# Patient Record
Sex: MALE | Race: WHITE | NOT HISPANIC OR LATINO | Employment: PART TIME | URBAN - METROPOLITAN AREA
[De-identification: names, ages, dates, MRNs, and addresses within clinical notes are randomized per-mention and may not be internally consistent; named-entity substitution may affect disease eponyms.]

---

## 2020-03-12 ENCOUNTER — OFFICE VISIT (OUTPATIENT)
Dept: FAMILY MEDICINE CLINIC | Facility: CLINIC | Age: 20
End: 2020-03-12

## 2020-03-12 VITALS
WEIGHT: 147.8 LBS | DIASTOLIC BLOOD PRESSURE: 84 MMHG | HEIGHT: 69 IN | HEART RATE: 87 BPM | BODY MASS INDEX: 21.89 KG/M2 | TEMPERATURE: 97.4 F | SYSTOLIC BLOOD PRESSURE: 148 MMHG | OXYGEN SATURATION: 98 %

## 2020-03-12 DIAGNOSIS — R39.15 URINARY URGENCY: ICD-10-CM

## 2020-03-12 DIAGNOSIS — Z11.4 SCREENING FOR HIV (HUMAN IMMUNODEFICIENCY VIRUS): Primary | ICD-10-CM

## 2020-03-12 DIAGNOSIS — Z20.2 POSSIBLE EXPOSURE TO STD: ICD-10-CM

## 2020-03-12 LAB
SL AMB  POCT GLUCOSE, UA: NORMAL
SL AMB LEUKOCYTE ESTERASE,UA: NORMAL
SL AMB POCT BILIRUBIN,UA: NORMAL
SL AMB POCT BLOOD,UA: NORMAL
SL AMB POCT CLARITY,UA: CLEAR
SL AMB POCT COLOR,UA: YELLOW
SL AMB POCT KETONES,UA: 5
SL AMB POCT NITRITE,UA: NORMAL
SL AMB POCT PH,UA: 6
SL AMB POCT SPECIFIC GRAVITY,UA: 1.01
SL AMB POCT URINE PROTEIN: 15
SL AMB POCT UROBILINOGEN: 0.2

## 2020-03-12 PROCEDURE — 3008F BODY MASS INDEX DOCD: CPT | Performed by: FAMILY MEDICINE

## 2020-03-12 PROCEDURE — 81002 URINALYSIS NONAUTO W/O SCOPE: CPT | Performed by: FAMILY MEDICINE

## 2020-03-12 PROCEDURE — 99203 OFFICE O/P NEW LOW 30 MIN: CPT | Performed by: FAMILY MEDICINE

## 2020-03-12 NOTE — PROGRESS NOTES
Assessment/Plan:       Problem List Items Addressed This Visit        Other    Urinary urgency     Urinary urgency and frequency over the last several months patient notes that he has difficulty holding the urine for short periods of time and then has significant urge and if he does not urinate he can develop incontinence  Urinalysis done here at the office is negative it is clear he denies any discharge or pain with urination no fever other abdominal pain or discomfort  At this point we discussed urology evaluation to further look into this which she is agreeable to         Relevant Orders    POCT urine dip (Completed)    Ambulatory referral to Urology    CBC and differential    Comprehensive metabolic panel    Herpes Simplex Virus (HSV) Types 1 and 2, DAVID    HEPATITIS B VIRUS (HBV) EVALUATION PROFILE    Chlamydia/GC amplified DNA by PCR      Other Visit Diagnoses     Screening for HIV (human immunodeficiency virus)    -  Primary    Relevant Orders    HIV 1/2 Antigen/Antibody (4th Generation) w Reflex [facility]    Possible exposure to STD        Relevant Orders    Herpes Simplex Virus (HSV) Types 1 and 2, DAVID    HEPATITIS B VIRUS (HBV) EVALUATION PROFILE    Chlamydia/GC amplified DNA by PCR            Subjective:      Patient ID: Edgar eB is a 22 y o  male  This patient presents for evaluation general checkup reestablish care with medical provider and discuss urinary frequency and urgency with occasional incontinence no sign of infection or burning or fever associated with this it is been present for the last several months      The following portions of the patient's history were reviewed and updated as appropriate: allergies, current medications, past family history, past medical history, past social history, past surgical history and problem list     Review of Systems   Constitutional: Negative for chills, fatigue and fever     HENT: Negative for congestion, nosebleeds, rhinorrhea, sinus pressure and sore throat  Eyes: Negative for discharge and redness  Respiratory: Negative for cough and shortness of breath  Cardiovascular: Negative for chest pain, palpitations and leg swelling  Gastrointestinal: Negative for abdominal pain, blood in stool and nausea  Endocrine: Negative for cold intolerance, heat intolerance and polyuria  Genitourinary: Positive for decreased urine volume, frequency and urgency  Negative for dysuria  Musculoskeletal: Negative for arthralgias, back pain and myalgias  Skin: Negative for rash  Neurological: Negative for dizziness, weakness and headaches  Hematological: Negative for adenopathy  Psychiatric/Behavioral: Negative for behavioral problems and sleep disturbance  The patient is not nervous/anxious  Objective:      /84   Pulse 87   Temp (!) 97 4 °F (36 3 °C)   Ht 5' 9" (1 753 m)   Wt 67 kg (147 lb 12 8 oz)   SpO2 98%   BMI 21 83 kg/m²        Physical Exam   Constitutional: He is oriented to person, place, and time  He appears well-developed and well-nourished  HENT:   Head: Normocephalic and atraumatic  Right Ear: External ear normal    Left Ear: External ear normal    Nose: Nose normal    Mouth/Throat: Oropharynx is clear and moist    Eyes: Pupils are equal, round, and reactive to light  Conjunctivae and EOM are normal  No scleral icterus  Neck: Normal range of motion  Neck supple  No JVD present  No thyromegaly present  Cardiovascular: Normal rate, regular rhythm and normal heart sounds  No murmur heard  Pulmonary/Chest: Effort normal and breath sounds normal  He has no wheezes  He has no rales  He exhibits no tenderness  Abdominal: Soft  Bowel sounds are normal  He exhibits no distension and no mass  There is no tenderness  There is no rebound and no guarding  Musculoskeletal: Normal range of motion  He exhibits no edema, tenderness or deformity  Lymphadenopathy:     He has no cervical adenopathy     Neurological: He is alert and oriented to person, place, and time  He has normal reflexes  He displays normal reflexes  No cranial nerve deficit  Skin: Skin is warm and dry  No rash noted  No erythema  Psychiatric: He has a normal mood and affect  His behavior is normal  Judgment and thought content normal    Nursing note and vitals reviewed         Data:    Laboratory Results:   Radiology/Other Diagnostic Testing Results:      No results found for: WBC, HGB, HCT, MCV, PLT  No results found for: NA, K, CL, CO2, ANIONGAP, BUN, CREATININE, GLUCOSE, GLUF, CALCIUM, CORRECTEDCA, AST, ALT, ALKPHOS, PROT, BILITOT, EGFR  No results found for: CHOLESTEROL  No results found for: HDL  No results found for: LDLCALC  No results found for: TRIG  No results found for: CHOLHDL  No results found for: [id number], TSH  No results found for: HGBA1C  No results found for: PSA    [facility], DO

## 2020-03-12 NOTE — ASSESSMENT & PLAN NOTE
Urinary urgency and frequency over the last several months patient notes that he has difficulty holding the urine for short periods of time and then has significant urge and if he does not urinate he can develop incontinence  Urinalysis done here at the office is negative it is clear he denies any discharge or pain with urination no fever other abdominal pain or discomfort    At this point we discussed urology evaluation to further look into this which she is agreeable to

## 2020-03-12 NOTE — PATIENT INSTRUCTIONS
Urinary Urgency and Frequency   AMBULATORY CARE:   Urinary urgency and frequency  is a condition that increases how strongly or how often you need to urinate  The condition may also be called urgency-frequency syndrome  Urinary urgency means you feel such a strong need to urinate that you have trouble waiting  You may also feel discomfort in your bladder  Urinary frequency means you need to urinate many times during the day  This may also be called increased daytime frequency  You may be woken from sleep by the need to urinate  Urgency and frequency often happen together, but you may only have one  Contact your healthcare provider if:   · Your urine is pink, or you notice blood in your urine  · You have pain with urination  · You continue to have symptoms even after you take your medicine  · You have new or worsening symptoms  · You have questions or concerns about your condition or care  Treatment  will depend on the type and cause of your urination problems  You may need any of the following:  · Medicines  may be given to relax your bladder and decrease urination  You may also need antibiotics if your symptoms are caused by a bacterial infection  · Sacral nerve stimulation  sends electrical signals to your sacral nerve through a small device implanted under your skin  Your sacral nerve controls your bladder, sphincter, and pelvic floor muscles  · Botox injections  into your bladder may help relax your bladder muscle to decrease urgency and frequency  · Surgery  may be done if all other treatments cannot help you control your bladder  Manage urinary urgency and frequency:   · Keep a record of your urination patterns for a few days  Write down the number of times you urinate over 24 hours, the amount, and if you have urine leakage  Record how strong the urge to urinate was each time  Your healthcare provider may also want you to record the type and amount of liquids you drink  · Train your bladder  Go to the bathroom at set times, such as every 2 hours, even if you do not feel the urge to go  You can also try to hold your urine when you feel the urge to go  For example, hold your urine for 5 minutes when you feel the urge to go  As that becomes easier, hold your urine for 10 minutes  Work up to every 3 or 4 hours to help control your bladder  · Limit liquids as directed  Limit liquids to decrease the amount you urinate  Ask how much liquid to drink each day and which liquids are best for you  You may need to avoid drinking liquids several hours before you go to sleep  Your healthcare provider may also recommend that you limit caffeine and alcohol  · Do Kegel exercises often  Kegel exercises help strengthen your pelvic muscles and improve bladder control  These muscles help you stop urinating  Squeeze these muscles tightly for 5 seconds like you are trying to stop the flow of urine  Then relax for 5 seconds  Gradually work up to squeezing for 10 seconds  Do 3 sets of 15 repetitions a day, or as directed  · Exercise regularly and maintain a healthy weight  Ask your healthcare provider how much you should weigh and about the best exercise plan for you  Extra weight puts pressure on your bladder and may make your symptoms worse  Ask your provider to help you create a safe weight loss plan if you are overweight  Follow up with your healthcare provider as directed: Your healthcare provider may refer you to a specialist to find the cause of your symptoms  Write down your questions so you remember to ask them during your visits  © 2017 [facility] [place] [employer] Information is for End User's use only and may not be sold, redistributed or otherwise used for commercial purposes  All illustrations and images included in CareNotes® are the copyrighted property of [employer] , [employer]  or [employer]  The above information is an  only   It is not intended as medical advice for individual conditions or treatments  Talk to your doctor, nurse or pharmacist before following any medical regimen to see if it is safe and effective for you

## 2020-09-03 ENCOUNTER — OFFICE VISIT (OUTPATIENT)
Dept: FAMILY MEDICINE CLINIC | Facility: CLINIC | Age: 20
End: 2020-09-03
Payer: COMMERCIAL

## 2020-09-03 VITALS
DIASTOLIC BLOOD PRESSURE: 80 MMHG | SYSTOLIC BLOOD PRESSURE: 122 MMHG | HEIGHT: 70 IN | HEART RATE: 82 BPM | OXYGEN SATURATION: 98 % | TEMPERATURE: 97.2 F | WEIGHT: 147 LBS | BODY MASS INDEX: 21.05 KG/M2

## 2020-09-03 DIAGNOSIS — F32.A MILD DEPRESSION: ICD-10-CM

## 2020-09-03 DIAGNOSIS — F41.9 ANXIETY: ICD-10-CM

## 2020-09-03 DIAGNOSIS — Z00.00 ANNUAL PHYSICAL EXAM: Primary | ICD-10-CM

## 2020-09-03 PROBLEM — F12.90 MARIJUANA USE: Status: ACTIVE | Noted: 2020-09-03

## 2020-09-03 PROBLEM — F10.10 ALCOHOL ABUSE: Status: ACTIVE | Noted: 2020-09-03

## 2020-09-03 PROCEDURE — 99395 PREV VISIT EST AGE 18-39: CPT | Performed by: NURSE PRACTITIONER

## 2020-09-03 RX ORDER — CITALOPRAM 10 MG/1
10 TABLET ORAL DAILY
Qty: 90 TABLET | Refills: 0 | Status: SHIPPED | OUTPATIENT
Start: 2020-09-03 | End: 2020-10-15 | Stop reason: SDUPTHER

## 2020-09-03 RX ORDER — BUSPIRONE HYDROCHLORIDE 5 MG/1
5 TABLET ORAL 2 TIMES DAILY
Qty: 60 TABLET | Refills: 0 | Status: SHIPPED | OUTPATIENT
Start: 2020-09-03 | End: 2020-09-29 | Stop reason: SDUPTHER

## 2020-09-03 NOTE — LETTER
September 3, 2020     Patient: Lucía Finnegan   YOB: 2000   Date of Visit: 9/3/2020       To Whom it May Concern:    Marbella Holt is under my professional care  He was seen in my office on 9/3/2020  If you have any questions or concerns, please don't hesitate to call           Sincerely,          JAILENE Rod        CC: No Recipients

## 2020-09-03 NOTE — PROGRESS NOTES
ADULT ANNUAL [address] [facility]    NAME: Constantino Gautam  AGE: 23 y o  SEX: male  : 2000     DATE: 9/3/2020     Assessment and Plan:     Problem List Items Addressed This Visit        Other    Mild depression ([address] )     We reviewed the risks and benefits of anti-depressant medication  We discussed that it can take several weeks for these medications to start working  Common side effects reviewed  The patient will follow-up in 1 month to recheck their symptoms  Relevant Medications    citalopram (CeleXA) 10 mg tablet    busPIRone (BUSPAR) 5 mg tablet      Other Visit Diagnoses     Annual physical exam    -  Primary    Anxiety        Relevant Medications    busPIRone (BUSPAR) 5 mg tablet          Immunizations and preventive care screenings were discussed with patient today  Appropriate education was printed on patient's after visit summary  Counseling:  Alcohol/drug use: discussed moderation in alcohol intake, the recommendations for healthy alcohol use, and avoidance of illicit drug use  Dental Health: discussed importance of regular tooth brushing, flossing, and dental visits  Injury prevention: discussed safety/seat belts, safety helmets, smoke detectors, carbon dioxide detectors, and smoking near bedding or upholstery  Sexual health: discussed sexually transmitted diseases, partner selection, use of condoms, avoidance of unintended pregnancy, and contraceptive alternatives  · Exercise: the importance of regular exercise/physical activity was discussed  Recommend exercise 3-5 times per week for at least 30 minutes  Depression Screening and Follow-up Plan: Patient's depression screening was positive with a PHQ-2 score of 3  Their PHQ-9 score was 10  Patient assessed for underlying major depression  Brief counseling provided and recommend additional follow-up/re-evaluation next office visit       Tobacco Cessation Counseling: Tobacco cessation counseling was provided  The patient is sincerely urged to quit consumption of tobacco  He is not ready to quit tobacco  Medication options and side effects of medication discussed  Patient refused medication  Return in about 6 weeks (around 10/15/2020)  Chief Complaint:     Chief Complaint   Patient presents with    Anxiety     discuss anxity       History of Present Illness:     Adult Annual Physical   Patient here for a comprehensive physical exam  The patient reports problems - anxiety  He reports having bad days  He reports progressing worsening symptoms for one year  He reports his symptoms are constantly worrying, he reports social anxiety, frantic at times  He reports his sleep habits are off, has loss of appetite at times  He has tried coping mechanism, keeping routines  Has hx of mental health problems in family, mother and brother  He does admit to drinking a few beers daily, weekly  Diet and Physical Activity  · Diet/Nutrition: well balanced diet  · Exercise: no formal exercise  Depression Screening  PHQ-9 Depression Screening    PHQ-9:    Frequency of the following problems over the past two weeks:       Little interest or pleasure in doing things:  1 - several days  Feeling down, depressed, or hopeless:  2 - more than half the days  Trouble falling or staying asleep, or sleeping too much:  2 - more than half the days  Feeling tired or having little energy:  2 - more than half the days  Poor appetite or overeatin - several days  Feeling bad about yourself - or that you are a failure or have let yourself or your family down:  0 - not at all  Trouble concentrating on things, such as reading the newspaper or watching television:  0 - not at all  Moving or speaking so slowly that other people could have noticed   Or the opposite - being so fidgety or restless that you have been moving around a lot more than usual:  1 - several days  Thoughts that you would be better off dead, or of hurting yourself in some way:  1 - several days  PHQ-2 Score:  3  PHQ-9 Score:  10       General Health  · Sleep: sleeps poorly  · Hearing: normal - bilateral   · Vision: no vision problems  · Dental: no dental visits for >1 year   Health  · History of STDs?: no      Review of Systems:     Review of Systems   Constitutional: Negative for appetite change, fatigue and fever  HENT: Negative for congestion, ear discharge, ear pain and postnasal drip  Eyes: Negative for pain, discharge, redness, itching and visual disturbance  Respiratory: Negative for chest tightness, shortness of breath and wheezing  Cardiovascular: Negative for chest pain, palpitations and leg swelling  Gastrointestinal: Negative for abdominal distention, abdominal pain, blood in stool, diarrhea, nausea and vomiting  Endocrine: Negative for cold intolerance, heat intolerance, polydipsia, polyphagia and polyuria  Genitourinary: Negative for decreased urine volume, difficulty urinating, dysuria, frequency, hematuria, testicular pain and urgency  Musculoskeletal: Negative for arthralgias, back pain, myalgias, neck pain and neck stiffness  Skin: Negative for color change, pallor, rash and wound  Neurological: Negative for dizziness, light-headedness, numbness and headaches  Hematological: Negative for adenopathy  Does not bruise/bleed easily  Psychiatric/Behavioral: Positive for sleep disturbance  Negative for agitation, behavioral problems, self-injury and suicidal ideas  The patient is nervous/anxious  Past Medical History:     Past Medical History:   Diagnosis Date    Low blood pressure     Urinary urgency     Urine incontinence       Past Surgical History:     History reviewed  No pertinent surgical history     Social History:        Social History     Socioeconomic History    Marital status: Single     Spouse name: None    Number of children: None    Years of education: None    Highest education level: None   Occupational History    None   Social Needs    Financial resource strain: None    Food insecurity     Worry: None     Inability: None    Transportation needs     Medical: None     Non-medical: None   Tobacco Use    Smoking status: Current Every Day Smoker    Smokeless tobacco: Former User   Substance and Sexual Activity    Alcohol use: Yes    Drug use: Yes     Types: Marijuana    Sexual activity: None   Lifestyle    Physical activity     Days per week: None     Minutes per session: None    Stress: None   Relationships    Social connections     Talks on phone: None     Gets together: None     Attends [employer] service: None     Active member of club or organization: None     Attends meetings of clubs or organizations: None     Relationship status: None    Intimate partner violence     Fear of current or ex partner: None     Emotionally abused: None     Physically abused: None     Forced sexual activity: None   Other Topics Concern    None   Social History Narrative    None   -*   Family History:     History reviewed  No pertinent family history  Current Medications:     Current Outpatient Medications   Medication Sig Dispense Refill    busPIRone (BUSPAR) 5 mg tablet Take 1 tablet (5 mg total) by mouth 2 (two) times a day 60 tablet 0    citalopram (CeleXA) 10 mg tablet Take 1 tablet (10 mg total) by mouth daily 90 tablet 0     No current facility-administered medications for this visit  Allergies:     No Known Allergies   Physical Exam:     /80 (BP Location: Left arm, Patient Position: Sitting, Cuff Size: Adult)   Pulse 82   Temp (!) 97 2 °F (36 2 °C) (Tympanic)   Ht 5' 10" (1 778 m)   Wt 66 7 kg (147 lb)   SpO2 98%   BMI 21 09 kg/m²     Physical Exam  Constitutional:       Appearance: Normal appearance  He is well-developed  HENT:      Head: Normocephalic and atraumatic        Right Ear: Tympanic membrane, ear canal and external ear normal       Left Ear: Tympanic membrane, ear canal and external ear normal       Nose: Nose normal  No congestion or rhinorrhea  Mouth/Throat:      Mouth: Mucous membranes are moist       Pharynx: No oropharyngeal exudate or posterior oropharyngeal erythema  Eyes:      General:         Right eye: No discharge  Left eye: No discharge  Conjunctiva/sclera: Conjunctivae normal       Pupils: Pupils are equal, round, and reactive to light  Neck:      Musculoskeletal: Normal range of motion and neck supple  Thyroid: No thyromegaly  Cardiovascular:      Rate and Rhythm: Normal rate and regular rhythm  Pulses: Normal pulses  Heart sounds: Normal heart sounds  Pulmonary:      Effort: Pulmonary effort is normal       Breath sounds: Normal breath sounds  No wheezing or rhonchi  Abdominal:      General: Bowel sounds are normal  There is no distension  Palpations: Abdomen is soft  Tenderness: There is no abdominal tenderness  Musculoskeletal: Normal range of motion  General: No swelling, tenderness or deformity  Right lower leg: No edema  Left lower leg: No edema  Skin:     General: Skin is warm and dry  Findings: No erythema or rash  Neurological:      General: No focal deficit present  Mental Status: He is alert and oriented to person, place, and time  Psychiatric:         Mood and Affect: Mood normal          Behavior: Behavior normal          Thought Content:  Thought content normal          Judgment: Judgment normal           Lois Preston, [address]

## 2020-09-03 NOTE — ASSESSMENT & PLAN NOTE
We reviewed the risks and benefits of anti-depressant medication  We discussed that it can take several weeks for these medications to start working  Common side effects reviewed  The patient will follow-up in 1 month to recheck their symptoms

## 2020-09-03 NOTE — PATIENT INSTRUCTIONS

## 2020-09-23 ENCOUNTER — APPOINTMENT (OUTPATIENT)
Dept: LAB | Facility: CLINIC | Age: 20
End: 2020-09-23
Payer: COMMERCIAL

## 2020-09-23 ENCOUNTER — OFFICE VISIT (OUTPATIENT)
Dept: FAMILY MEDICINE CLINIC | Facility: CLINIC | Age: 20
End: 2020-09-23
Payer: COMMERCIAL

## 2020-09-23 VITALS
HEART RATE: 68 BPM | WEIGHT: 146 LBS | BODY MASS INDEX: 20.9 KG/M2 | SYSTOLIC BLOOD PRESSURE: 124 MMHG | OXYGEN SATURATION: 98 % | DIASTOLIC BLOOD PRESSURE: 88 MMHG | TEMPERATURE: 98.6 F | HEIGHT: 70 IN

## 2020-09-23 DIAGNOSIS — R55 VASOVAGAL SYNCOPE: ICD-10-CM

## 2020-09-23 DIAGNOSIS — R39.15 URINARY URGENCY: ICD-10-CM

## 2020-09-23 DIAGNOSIS — F32.A MILD DEPRESSION: Primary | ICD-10-CM

## 2020-09-23 DIAGNOSIS — Z11.4 SCREENING FOR HIV (HUMAN IMMUNODEFICIENCY VIRUS): ICD-10-CM

## 2020-09-23 DIAGNOSIS — F41.9 ANXIETY: ICD-10-CM

## 2020-09-23 DIAGNOSIS — Z20.2 POSSIBLE EXPOSURE TO STD: ICD-10-CM

## 2020-09-23 LAB
ALBUMIN SERPL BCP-MCNC: 4.5 G/DL (ref 3.5–5)
ALP SERPL-CCNC: 68 U/L (ref 46–116)
ALT SERPL W P-5'-P-CCNC: 25 U/L (ref 12–78)
ANION GAP SERPL CALCULATED.3IONS-SCNC: 4 MMOL/L (ref 4–13)
AST SERPL W P-5'-P-CCNC: 19 U/L (ref 5–45)
BASOPHILS # BLD AUTO: 0.1 THOUSANDS/ΜL (ref 0–0.1)
BASOPHILS NFR BLD AUTO: 1 % (ref 0–1)
BILIRUB SERPL-MCNC: 0.54 MG/DL (ref 0.2–1)
BUN SERPL-MCNC: 9 MG/DL (ref 5–25)
CALCIUM SERPL-MCNC: 10 MG/DL (ref 8.3–10.1)
CHLORIDE SERPL-SCNC: 106 MMOL/L (ref 100–108)
CO2 SERPL-SCNC: 29 MMOL/L (ref 21–32)
CREAT SERPL-MCNC: 1.01 MG/DL (ref 0.6–1.3)
EOSINOPHIL # BLD AUTO: 0.15 THOUSAND/ΜL (ref 0–0.61)
EOSINOPHIL NFR BLD AUTO: 2 % (ref 0–6)
ERYTHROCYTE [DISTWIDTH] IN BLOOD BY AUTOMATED COUNT: 12.5 % (ref 11.6–15.1)
GFR SERPL CREATININE-BSD FRML MDRD: 105 ML/MIN/1.73SQ M
GLUCOSE P FAST SERPL-MCNC: 86 MG/DL (ref 65–99)
HBV SURFACE AB SER-ACNC: <3.1 MIU/ML
HCT VFR BLD AUTO: 52.2 % (ref 36.5–49.3)
HGB BLD-MCNC: 17.6 G/DL (ref 12–17)
IMM GRANULOCYTES # BLD AUTO: 0.05 THOUSAND/UL (ref 0–0.2)
IMM GRANULOCYTES NFR BLD AUTO: 1 % (ref 0–2)
LYMPHOCYTES # BLD AUTO: 2.02 THOUSANDS/ΜL (ref 0.6–4.47)
LYMPHOCYTES NFR BLD AUTO: 20 % (ref 14–44)
MCH RBC QN AUTO: 31.5 PG (ref 26.8–34.3)
MCHC RBC AUTO-ENTMCNC: 33.7 G/DL (ref 31.4–37.4)
MCV RBC AUTO: 94 FL (ref 82–98)
MONOCYTES # BLD AUTO: 0.78 THOUSAND/ΜL (ref 0.17–1.22)
MONOCYTES NFR BLD AUTO: 8 % (ref 4–12)
NEUTROPHILS # BLD AUTO: 6.95 THOUSANDS/ΜL (ref 1.85–7.62)
NEUTS SEG NFR BLD AUTO: 68 % (ref 43–75)
NRBC BLD AUTO-RTO: 0 /100 WBCS
PLATELET # BLD AUTO: 272 THOUSANDS/UL (ref 149–390)
PMV BLD AUTO: 9.6 FL (ref 8.9–12.7)
POTASSIUM SERPL-SCNC: 4.2 MMOL/L (ref 3.5–5.3)
PROT SERPL-MCNC: 8.3 G/DL (ref 6.4–8.2)
RBC # BLD AUTO: 5.58 MILLION/UL (ref 3.88–5.62)
SODIUM SERPL-SCNC: 139 MMOL/L (ref 136–145)
TSH SERPL DL<=0.05 MIU/L-ACNC: 1 UIU/ML (ref 0.36–3.74)
WBC # BLD AUTO: 10.05 THOUSAND/UL (ref 4.31–10.16)

## 2020-09-23 PROCEDURE — 36415 COLL VENOUS BLD VENIPUNCTURE: CPT

## 2020-09-23 PROCEDURE — 87491 CHLMYD TRACH DNA AMP PROBE: CPT

## 2020-09-23 PROCEDURE — 87591 N.GONORRHOEAE DNA AMP PROB: CPT

## 2020-09-23 PROCEDURE — 86695 HERPES SIMPLEX TYPE 1 TEST: CPT

## 2020-09-23 PROCEDURE — 84443 ASSAY THYROID STIM HORMONE: CPT

## 2020-09-23 PROCEDURE — 87389 HIV-1 AG W/HIV-1&-2 AB AG IA: CPT

## 2020-09-23 PROCEDURE — 80053 COMPREHEN METABOLIC PANEL: CPT

## 2020-09-23 PROCEDURE — 86696 HERPES SIMPLEX TYPE 2 TEST: CPT

## 2020-09-23 PROCEDURE — 85025 COMPLETE CBC W/AUTO DIFF WBC: CPT

## 2020-09-23 PROCEDURE — 86706 HEP B SURFACE ANTIBODY: CPT

## 2020-09-23 PROCEDURE — 99214 OFFICE O/P EST MOD 30 MIN: CPT | Performed by: NURSE PRACTITIONER

## 2020-09-23 NOTE — PROGRESS NOTES
OFFICE VISIT  Veena Fitzpatrick 23 y o  male MRN: [id number]          Assessment / Plan:  Problem List Items Addressed This Visit        Cardiovascular and Mediastinum    Vasovagal syncope     Obtain labs to rule out any underlying deficiencies  Smoking cessation highly advised         Relevant Orders    CBC and differential    Comprehensive metabolic panel    TSH, 3rd generation with Free T4 reflex    ECG 12 lead       Other    Mild depression ([facility] [address] ) - Primary     Follow up in 3 weeks, at that point he will reach therapeutic level  Anxiety     continue buspar, stop smoking marijuana                  Reason For Visit / Chief Complaint  Chief Complaint   Patient presents with    Follow-up     pt is in office today to discuss an issue with passing out, pt states that about twice a year he has episodes of passing out, pt staes that he has been having this issue since he was a child  HPI:  Veena Fitzpatrick is a 23 y o  male He reports passing out two days  He reports his step brother has witnessed event  He reports passing out for approx 30 sec to one minute  He reports confusion and disoriented  He reports this has occurred in the past, 6 months prior, both time were associated with smoking marijuana  He reports this has occireed smoking marijuana  He smokes marijuana at hs, daily to help aid in sleeping, he has tried other otc medication and has been unsuccessful  He does report an occurrence when he got a tattoo on his wrist    He reports his depression is somewhat better  Taking celexa and buspar  Historical Information   Past Medical History:   Diagnosis Date    Low blood pressure     Urinary urgency     Urine incontinence      History reviewed  No pertinent surgical history    Social History   Social History     Substance and Sexual Activity   Alcohol Use Yes     Social History     Substance and Sexual Activity   Drug Use Yes    Types: Marijuana     Social History     Tobacco Use   Smoking Status Current Every Day Smoker   Smokeless Tobacco Former User     Family History   Problem Relation Age of Onset    No Known Problems Mother     No Known Problems Father        Meds/Allergies   No Known Allergies    Meds:    Current Outpatient Medications:     busPIRone (BUSPAR) 5 mg tablet, Take 1 tablet (5 mg total) by mouth 2 (two) times a day, Disp: 60 tablet, Rfl: 0    citalopram (CeleXA) 10 mg tablet, Take 1 tablet (10 mg total) by mouth daily, Disp: 90 tablet, Rfl: 0      REVIEW OF SYSTEMS  Review of Systems   Constitutional: Negative for appetite change, fatigue and fever  HENT: Negative for congestion, ear discharge, ear pain and postnasal drip  Eyes: Negative for pain, discharge, redness, itching and visual disturbance  Respiratory: Negative for chest tightness, shortness of breath and wheezing  Cardiovascular: Negative for chest pain, palpitations and leg swelling  Gastrointestinal: Negative for abdominal distention, abdominal pain, blood in stool, diarrhea, nausea and vomiting  Endocrine: Negative for cold intolerance, heat intolerance, polydipsia, polyphagia and polyuria  Genitourinary: Negative for decreased urine volume, difficulty urinating, dysuria, frequency, hematuria, testicular pain and urgency  Musculoskeletal: Negative for arthralgias, back pain, myalgias, neck pain and neck stiffness  Skin: Negative for color change, pallor, rash and wound  Neurological: Positive for speech difficulty  Negative for dizziness, light-headedness, numbness and headaches  Hematological: Negative for adenopathy  Does not bruise/bleed easily  Psychiatric/Behavioral: Negative for agitation, behavioral problems, self-injury, sleep disturbance and suicidal ideas  The patient is nervous/anxious              Current Vitals:   Blood Pressure: 124/88 (09/23/20 1046)  Pulse: 68 (09/23/20 1046)  Temperature: 98 6 °F (37 °C) (09/23/20 1046)  Height: 5' 10" (177 8 cm) (09/23/20 1046)  Weight - Scale: 66 2 kg (146 lb) (09/23/20 1046)  SpO2: 98 % (09/23/20 1046)  [unfilled]    PHYSICAL EXAMS:  Physical Exam  Constitutional:       Appearance: Normal appearance  He is well-developed  HENT:      Head: Normocephalic and atraumatic  Right Ear: Tympanic membrane, ear canal and external ear normal       Left Ear: Tympanic membrane, ear canal and external ear normal       Nose: Nose normal  No congestion or rhinorrhea  Mouth/Throat:      Mouth: Mucous membranes are moist       Pharynx: No oropharyngeal exudate or posterior oropharyngeal erythema  Eyes:      General:         Right eye: No discharge  Left eye: No discharge  Conjunctiva/sclera: Conjunctivae normal       Pupils: Pupils are equal, round, and reactive to light  Neck:      Musculoskeletal: Normal range of motion and neck supple  Thyroid: No thyromegaly  Cardiovascular:      Rate and Rhythm: Normal rate and regular rhythm  Pulses: Normal pulses  Heart sounds: Normal heart sounds  Pulmonary:      Effort: Pulmonary effort is normal       Breath sounds: Normal breath sounds  No wheezing or rhonchi  Abdominal:      General: Bowel sounds are normal  There is no distension  Palpations: Abdomen is soft  Tenderness: There is no abdominal tenderness  Musculoskeletal: Normal range of motion  General: No swelling, tenderness or deformity  Right lower leg: No edema  Left lower leg: No edema  Skin:     General: Skin is warm and dry  Findings: No erythema or rash  Neurological:      General: No focal deficit present  Mental Status: He is alert and oriented to person, place, and time  Psychiatric:         Mood and Affect: Mood normal          Behavior: Behavior normal          Thought Content: Thought content normal          Judgment: Judgment normal              Lab, imaging and other studies: I have personally reviewed pertinent reports  Dara Soulier

## 2020-09-24 ENCOUNTER — TELEPHONE (OUTPATIENT)
Dept: FAMILY MEDICINE CLINIC | Facility: CLINIC | Age: 20
End: 2020-09-24

## 2020-09-24 LAB
HIV 1+2 AB+HIV1 P24 AG SERPL QL IA: NORMAL
HSV1 IGG SER IA-ACNC: <0.91 INDEX (ref 0–0.9)
HSV2 IGG SER IA-ACNC: <0.91 INDEX (ref 0–0.9)

## 2020-09-24 NOTE — LETTER
September 24, 2020     Patient: Lucía Finnegan   YOB: 2000   Date of Visit: 9/24/2020       To Whom it May Concern:    Marbella Holt was seen in my clinic on 9/24/2020  He may return to work on 9 25 20  If you have any questions or concerns, please don't hesitate to call           Sincerely,          Ruchi Santiago        CC: No Recipients

## 2020-09-29 DIAGNOSIS — F41.9 ANXIETY: ICD-10-CM

## 2020-09-29 RX ORDER — BUSPIRONE HYDROCHLORIDE 5 MG/1
5 TABLET ORAL 2 TIMES DAILY
Qty: 60 TABLET | Refills: 1 | Status: SHIPPED | OUTPATIENT
Start: 2020-09-29 | End: 2020-10-12 | Stop reason: SDUPTHER

## 2020-10-01 LAB
C TRACH DNA SPEC QL NAA+PROBE: NEGATIVE
N GONORRHOEA DNA SPEC QL NAA+PROBE: NEGATIVE

## 2020-10-12 DIAGNOSIS — F41.9 ANXIETY: ICD-10-CM

## 2020-10-12 RX ORDER — BUSPIRONE HYDROCHLORIDE 5 MG/1
5 TABLET ORAL 2 TIMES DAILY
Qty: 60 TABLET | Refills: 1 | Status: SHIPPED | OUTPATIENT
Start: 2020-10-12 | End: 2020-10-15 | Stop reason: SDUPTHER

## 2020-10-15 ENCOUNTER — TELEMEDICINE (OUTPATIENT)
Dept: FAMILY MEDICINE CLINIC | Facility: CLINIC | Age: 20
End: 2020-10-15
Payer: COMMERCIAL

## 2020-10-15 DIAGNOSIS — F32.A MILD DEPRESSION: ICD-10-CM

## 2020-10-15 DIAGNOSIS — F41.9 ANXIETY: Primary | ICD-10-CM

## 2020-10-15 DIAGNOSIS — F12.90 MARIJUANA USE: ICD-10-CM

## 2020-10-15 PROCEDURE — 4004F PT TOBACCO SCREEN RCVD TLK: CPT | Performed by: NURSE PRACTITIONER

## 2020-10-15 PROCEDURE — 3725F SCREEN DEPRESSION PERFORMED: CPT | Performed by: NURSE PRACTITIONER

## 2020-10-15 PROCEDURE — 99212 OFFICE O/P EST SF 10 MIN: CPT | Performed by: NURSE PRACTITIONER

## 2020-10-15 RX ORDER — CITALOPRAM 20 MG/1
20 TABLET ORAL DAILY
Qty: 30 TABLET | Refills: 0 | Status: SHIPPED | OUTPATIENT
Start: 2020-10-15

## 2020-10-15 RX ORDER — BUSPIRONE HYDROCHLORIDE 10 MG/1
10 TABLET ORAL 3 TIMES DAILY
Qty: 90 TABLET | Refills: 0 | Status: SHIPPED | OUTPATIENT
Start: 2020-10-15

## 2020-11-05 ENCOUNTER — OFFICE VISIT (OUTPATIENT)
Dept: FAMILY MEDICINE CLINIC | Facility: CLINIC | Age: 20
End: 2020-11-05
Payer: COMMERCIAL

## 2020-11-05 VITALS
TEMPERATURE: 97.5 F | HEIGHT: 70 IN | HEART RATE: 76 BPM | SYSTOLIC BLOOD PRESSURE: 130 MMHG | BODY MASS INDEX: 21.45 KG/M2 | DIASTOLIC BLOOD PRESSURE: 80 MMHG | WEIGHT: 149.8 LBS | OXYGEN SATURATION: 98 %

## 2020-11-05 DIAGNOSIS — M54.50 ACUTE BILATERAL LOW BACK PAIN WITHOUT SCIATICA: ICD-10-CM

## 2020-11-05 DIAGNOSIS — F32.A MILD DEPRESSION: ICD-10-CM

## 2020-11-05 DIAGNOSIS — F41.9 ANXIETY: ICD-10-CM

## 2020-11-05 PROCEDURE — 99214 OFFICE O/P EST MOD 30 MIN: CPT | Performed by: NURSE PRACTITIONER

## 2020-11-05 PROCEDURE — 3008F BODY MASS INDEX DOCD: CPT | Performed by: NURSE PRACTITIONER

## 2020-11-05 PROCEDURE — 4004F PT TOBACCO SCREEN RCVD TLK: CPT | Performed by: NURSE PRACTITIONER

## 2021-12-31 ENCOUNTER — HOSPITAL ENCOUNTER (EMERGENCY)
Facility: HOSPITAL | Age: 21
Discharge: LEFT BEFORE DISCHARGE | End: 2021-12-31
Attending: EMERGENCY MEDICINE

## 2021-12-31 VITALS
WEIGHT: 145.06 LBS | BODY MASS INDEX: 20.81 KG/M2 | TEMPERATURE: 97.4 F | DIASTOLIC BLOOD PRESSURE: 80 MMHG | SYSTOLIC BLOOD PRESSURE: 120 MMHG | RESPIRATION RATE: 20 BRPM | OXYGEN SATURATION: 99 % | HEART RATE: 86 BPM

## 2021-12-31 DIAGNOSIS — F19.10 POLYSUBSTANCE ABUSE (HCC): Primary | ICD-10-CM

## 2021-12-31 LAB
AMPHETAMINES SERPL QL SCN: POSITIVE
BARBITURATES UR QL: NEGATIVE
BENZODIAZ UR QL: NEGATIVE
COCAINE UR QL: NEGATIVE
ETHANOL EXG-MCNC: 0 MG/DL
FLUAV RNA RESP QL NAA+PROBE: NEGATIVE
FLUBV RNA RESP QL NAA+PROBE: NEGATIVE
METHADONE UR QL: NEGATIVE
OPIATES UR QL SCN: NEGATIVE
OXYCODONE+OXYMORPHONE UR QL SCN: NEGATIVE
PCP UR QL: NEGATIVE
RSV RNA RESP QL NAA+PROBE: NEGATIVE
SARS-COV-2 RNA RESP QL NAA+PROBE: NEGATIVE
THC UR QL: POSITIVE

## 2021-12-31 PROCEDURE — 99283 EMERGENCY DEPT VISIT LOW MDM: CPT

## 2021-12-31 PROCEDURE — 80307 DRUG TEST PRSMV CHEM ANLYZR: CPT | Performed by: EMERGENCY MEDICINE

## 2021-12-31 PROCEDURE — 0241U HB NFCT DS VIR RESP RNA 4 TRGT: CPT | Performed by: EMERGENCY MEDICINE

## 2021-12-31 PROCEDURE — 82075 ASSAY OF BREATH ETHANOL: CPT | Performed by: EMERGENCY MEDICINE

## 2021-12-31 PROCEDURE — 99284 EMERGENCY DEPT VISIT MOD MDM: CPT | Performed by: EMERGENCY MEDICINE

## 2021-12-31 RX ORDER — NICOTINE 21 MG/24HR
14 PATCH, TRANSDERMAL 24 HOURS TRANSDERMAL ONCE
Status: DISCONTINUED | OUTPATIENT
Start: 2021-12-31 | End: 2022-01-01 | Stop reason: HOSPADM

## 2021-12-31 RX ORDER — LORAZEPAM 1 MG/1
2 TABLET ORAL ONCE
Status: COMPLETED | OUTPATIENT
Start: 2021-12-31 | End: 2021-12-31

## 2021-12-31 RX ADMIN — LORAZEPAM 2 MG: 1 TABLET ORAL at 22:09

## 2021-12-31 RX ADMIN — NICOTINE 14 MG: 14 PATCH, EXTENDED RELEASE TRANSDERMAL at 20:03

## 2022-01-01 NOTE — ED PROVIDER NOTES
History  Chief Complaint   Patient presents with    Drug / Alcohol Assessment     18 yo male with a history of polysubstance abuse presents to the ED requesting detox from crystal meth, marijuana, and alcohol  The patient states he uses all three substances "almost every day" and would like to get help  Last use this morning  He denies SI, HI, and hallucinations  No history of alcohol withdrawal seizures  No chest pain, shortness of breath, nausea, or vomiting  No tremors  No other specific complaints  Prior to Admission Medications   Prescriptions Last Dose Informant Patient Reported? Taking?   busPIRone (BUSPAR) 10 mg tablet  Self No No   Sig: Take 1 tablet (10 mg total) by mouth 3 (three) times a day   citalopram (CeleXA) 20 mg tablet  Self No No   Sig: Take 1 tablet (20 mg total) by mouth daily      Facility-Administered Medications: None       Past Medical History:   Diagnosis Date    Low blood pressure     Urinary urgency     Urine incontinence        History reviewed  No pertinent surgical history  Family History   Problem Relation Age of Onset    No Known Problems Mother     No Known Problems Father      I have reviewed and agree with the history as documented  E-Cigarette/Vaping    E-Cigarette Use Never User      E-Cigarette/Vaping Substances    Nicotine No     THC No     CBD No     Flavoring No     Other No     Unknown No      Social History     Tobacco Use    Smoking status: Current Every Day Smoker     Packs/day: 1 50     Types: Cigarettes    Smokeless tobacco: Former User     Types: Chew     Quit date: 2016   Vaping Use    Vaping Use: Never used   Substance Use Topics    Alcohol use: Yes     Comment: occasion    Drug use: Yes     Types: Marijuana, Methamphetamines       Review of Systems   Constitutional: Negative for chills and fever  HENT: Negative for sore throat  Respiratory: Negative for cough and shortness of breath      Cardiovascular: Negative for chest pain and palpitations  Gastrointestinal: Negative for abdominal pain, diarrhea, nausea and vomiting  Endocrine: Negative for cold intolerance and heat intolerance  Genitourinary: Negative for dysuria and flank pain  Musculoskeletal: Negative for back pain  Skin: Negative for rash  Allergic/Immunologic: Negative for immunocompromised state  Neurological: Negative for tremors and headaches  Hematological: Negative for adenopathy  Psychiatric/Behavioral: Negative for suicidal ideas  The patient is not nervous/anxious  Physical Exam  Physical Exam  Constitutional:       General: He is not in acute distress  Appearance: He is well-developed  HENT:      Head: Normocephalic and atraumatic  Eyes:      Pupils: Pupils are equal, round, and reactive to light  Cardiovascular:      Rate and Rhythm: Normal rate and regular rhythm  Pulmonary:      Effort: Pulmonary effort is normal  No respiratory distress  Breath sounds: Normal breath sounds  Abdominal:      General: There is no distension  Palpations: Abdomen is soft  Tenderness: There is no abdominal tenderness  Musculoskeletal:         General: Normal range of motion  Cervical back: Normal range of motion and neck supple  Skin:     General: Skin is warm and dry  Neurological:      Mental Status: He is alert and oriented to person, place, and time           Vital Signs  ED Triage Vitals [12/31/21 1854]   Temperature Pulse Respirations Blood Pressure SpO2   (!) 97 4 °F (36 3 °C) 86 20 120/80 99 %      Temp Source Heart Rate Source Patient Position - Orthostatic VS BP Location FiO2 (%)   Tympanic Monitor Sitting Left arm --      Pain Score       3           Vitals:    12/31/21 1854   BP: 120/80   Pulse: 86   Patient Position - Orthostatic VS: Sitting         Visual Acuity      ED Medications  Medications   LORazepam (ATIVAN) tablet 2 mg (2 mg Oral Given 12/31/21 2201)       Diagnostic Studies  Results Reviewed Procedure Component Value Units Date/Time    COVID/FLU/RSV - 2 hour TAT [[id number]]  (Normal) Collected: 12/31/21 1919    Lab Status: Final result Specimen: Nares from Nose Updated: 12/31/21 2011     SARS-CoV-2 Negative     INFLUENZA A PCR Negative     INFLUENZA B PCR Negative     RSV PCR Negative    Narrative:      FOR PEDIATRIC PATIENTS - copy/paste COVID Guidelines URL to browser: [email or web address]  [place]     This test has been authorized by FDA under an EUA (Emergency Use Assay) for use by authorized laboratories  Clinical caution and judgement should be used with the interpretation of these results with consideration of the clinical impression and other laboratory testing  Testing reported as "Positive" or "Negative" has been proven to be accurate according to standard laboratory validation requirements  All testing is performed with control materials showing appropriate reactivity at standard intervals  Rapid drug screen, urine [[id number]]  (Abnormal) Collected: 12/31/21 1927    Lab Status: Final result Specimen: Urine, Other Updated: 12/31/21 1957     Amph/Meth UR Positive     Barbiturate Ur Negative     Benzodiazepine Urine Negative     Cocaine Urine Negative     Methadone Urine Negative     Opiate Urine Negative     PCP Ur Negative     THC Urine Positive     Oxycodone Urine Negative    Narrative:      Presumptive report  If requested, specimen will be sent to reference lab for confirmation  FOR MEDICAL PURPOSES ONLY  IF CONFIRMATION NEEDED PLEASE CONTACT THE LAB WITHIN 5 DAYS      Drug Screen Cutoff Levels:  AMPHETAMINE/METHAMPHETAMINES  1000 ng/mL  BARBITURATES     200 ng/mL  BENZODIAZEPINES     200 ng/mL  COCAINE      300 ng/mL  METHADONE      300 ng/mL  OPIATES      300 ng/mL  PHENCYCLIDINE     25 ng/mL  THC       50 ng/mL  OXYCODONE      100 ng/mL    POCT alcohol breath test [[id number]]  (Normal) Resulted: 12/31/21 1919    Lab Status: Final result Updated: 12/31/21 1919     EXTBreath Alcohol 0 000                 No orders to display              Procedures  Procedures         ED Course                                             MDM  Number of Diagnoses or Management Options  Polysubstance abuse ([facility] [address] )  Diagnosis management comments: The patient is comfortable appearing with stable vital signs and a benign exam  No signs or symptoms of active withdrawal  Case discussed with Crisis --> plan for HOST referral after medical clearance  Will continue to monitor in the ED  23:40 Unable to locate the patient in the ED  Per RN he eloped with all of his belongings  Crisis notified  Amount and/or Complexity of Data Reviewed  Clinical lab tests: ordered and reviewed    Patient Progress  Patient progress: stable      Disposition  Final diagnoses:   Polysubstance abuse ([facility] [address] )     Time reflects when diagnosis was documented in both MDM as applicable and the Disposition within this note     Time User Action Codes Description Comment    1/1/2022  2:53 AM Eric Medina Add [F19 10] Polysubstance abuse [facility])       ED Disposition     ED Disposition Condition Date/Time Comment    Eloped  Fri Dec 31, 2021 11:40 PM         Follow-up Information    None         Discharge Medication List as of 12/31/2021 11:40 PM      CONTINUE these medications which have NOT CHANGED    Details   busPIRone (BUSPAR) 10 mg tablet Take 1 tablet (10 mg total) by mouth 3 (three) times a day, Starting Thu 10/15/2020, Normal      citalopram (CeleXA) 20 mg tablet Take 1 tablet (20 mg total) by mouth daily, Starting Thu 10/15/2020, Normal             No discharge procedures on file      PDMP Review     None          ED Provider  Electronically Signed by           Amelia Mejia MD  01/01/22 6122

## 2022-10-24 ENCOUNTER — APPOINTMENT (EMERGENCY)
Dept: CT IMAGING | Facility: HOSPITAL | Age: 22
End: 2022-10-24

## 2022-10-24 ENCOUNTER — HOSPITAL ENCOUNTER (EMERGENCY)
Facility: HOSPITAL | Age: 22
Discharge: HOME | End: 2022-10-24
Attending: EMERGENCY MEDICINE

## 2022-10-24 VITALS
DIASTOLIC BLOOD PRESSURE: 87 MMHG | RESPIRATION RATE: 16 BRPM | HEART RATE: 107 BPM | OXYGEN SATURATION: 98 % | TEMPERATURE: 97.4 F | SYSTOLIC BLOOD PRESSURE: 145 MMHG

## 2022-10-24 DIAGNOSIS — R10.9 ABDOMINAL PAIN: Primary | ICD-10-CM

## 2022-10-24 LAB
ALBUMIN SERPL BCP-MCNC: 3.6 G/DL (ref 3.5–5)
ALP SERPL-CCNC: 56 U/L (ref 46–116)
ALT SERPL W P-5'-P-CCNC: 39 U/L (ref 12–78)
ANION GAP SERPL CALCULATED.3IONS-SCNC: 6 MMOL/L (ref 4–13)
AST SERPL W P-5'-P-CCNC: 31 U/L (ref 5–45)
BASOPHILS # BLD AUTO: 0.08 THOUSANDS/ÂΜL (ref 0–0.1)
BASOPHILS NFR BLD AUTO: 1 % (ref 0–1)
BILIRUB DIRECT SERPL-MCNC: 0.08 MG/DL (ref 0–0.2)
BILIRUB SERPL-MCNC: 0.24 MG/DL (ref 0.2–1)
BILIRUB UR QL STRIP: NEGATIVE
BUN SERPL-MCNC: 15 MG/DL (ref 5–25)
CALCIUM SERPL-MCNC: 9 MG/DL (ref 8.3–10.1)
CHLORIDE SERPL-SCNC: 107 MMOL/L (ref 96–108)
CLARITY UR: CLEAR
CO2 SERPL-SCNC: 30 MMOL/L (ref 21–32)
COLOR UR: COLORLESS
CREAT SERPL-MCNC: 0.83 MG/DL (ref 0.6–1.3)
EOSINOPHIL # BLD AUTO: 0.42 THOUSAND/ÂΜL (ref 0–0.61)
EOSINOPHIL NFR BLD AUTO: 4 % (ref 0–6)
ERYTHROCYTE [DISTWIDTH] IN BLOOD BY AUTOMATED COUNT: 12.8 % (ref 11.6–15.1)
GFR SERPL CREATININE-BSD FRML MDRD: 123 ML/MIN/1.73SQ M
GLUCOSE SERPL-MCNC: 85 MG/DL (ref 65–140)
GLUCOSE UR STRIP-MCNC: NEGATIVE MG/DL
HCT VFR BLD AUTO: 43.3 % (ref 36.5–49.3)
HGB BLD-MCNC: 14.6 G/DL (ref 12–17)
HGB UR QL STRIP.AUTO: NEGATIVE
IMM GRANULOCYTES # BLD AUTO: 0.03 THOUSAND/UL (ref 0–0.2)
IMM GRANULOCYTES NFR BLD AUTO: 0 % (ref 0–2)
KETONES UR STRIP-MCNC: NEGATIVE MG/DL
LEUKOCYTE ESTERASE UR QL STRIP: NEGATIVE
LYMPHOCYTES # BLD AUTO: 1.49 THOUSANDS/ÂΜL (ref 0.6–4.47)
LYMPHOCYTES NFR BLD AUTO: 12 % (ref 14–44)
MCH RBC QN AUTO: 31.7 PG (ref 26.8–34.3)
MCHC RBC AUTO-ENTMCNC: 33.7 G/DL (ref 31.4–37.4)
MCV RBC AUTO: 94 FL (ref 82–98)
MONOCYTES # BLD AUTO: 0.87 THOUSAND/ÂΜL (ref 0.17–1.22)
MONOCYTES NFR BLD AUTO: 7 % (ref 4–12)
NEUTROPHILS # BLD AUTO: 9.25 THOUSANDS/ÂΜL (ref 1.85–7.62)
NEUTS SEG NFR BLD AUTO: 76 % (ref 43–75)
NITRITE UR QL STRIP: NEGATIVE
NRBC BLD AUTO-RTO: 0 /100 WBCS
PH UR STRIP.AUTO: 7 [PH]
PLATELET # BLD AUTO: 283 THOUSANDS/UL (ref 149–390)
PMV BLD AUTO: 9.1 FL (ref 8.9–12.7)
POTASSIUM SERPL-SCNC: 4.5 MMOL/L (ref 3.5–5.3)
PROT SERPL-MCNC: 6.4 G/DL (ref 6.4–8.4)
PROT UR STRIP-MCNC: NEGATIVE MG/DL
RBC # BLD AUTO: 4.6 MILLION/UL (ref 3.88–5.62)
SODIUM SERPL-SCNC: 143 MMOL/L (ref 135–147)
SP GR UR STRIP.AUTO: 1.04 (ref 1–1.03)
UROBILINOGEN UR STRIP-ACNC: <2 MG/DL
WBC # BLD AUTO: 12.14 THOUSAND/UL (ref 4.31–10.16)

## 2022-10-24 RX ORDER — KETOROLAC TROMETHAMINE 30 MG/ML
15 INJECTION, SOLUTION INTRAMUSCULAR; INTRAVENOUS ONCE
Status: COMPLETED | OUTPATIENT
Start: 2022-10-24 | End: 2022-10-24

## 2022-10-24 RX ORDER — DICYCLOMINE HCL 20 MG
20 TABLET ORAL 2 TIMES DAILY
Qty: 20 TABLET | Refills: 0 | Status: SHIPPED | OUTPATIENT
Start: 2022-10-24

## 2022-10-24 RX ADMIN — SODIUM CHLORIDE 1000 ML: 0.9 INJECTION, SOLUTION INTRAVENOUS at 15:33

## 2022-10-24 RX ADMIN — IOHEXOL 100 ML: 350 INJECTION, SOLUTION INTRAVENOUS at 16:04

## 2022-10-24 RX ADMIN — KETOROLAC TROMETHAMINE 15 MG: 30 INJECTION, SOLUTION INTRAMUSCULAR at 15:33

## 2022-10-24 NOTE — ED NOTES
Patient transported to [facility], [address] [place] [address]  10/24/22 0220

## 2022-10-24 NOTE — ED PROVIDER NOTES
History  Chief Complaint   Patient presents with   • Abdominal Pain     Pt reports LLQ abdominal pain states "I can feel a bump and when I sit down it moves to the other side" pt concerned for hernia      Adeline Douglas is a 23year-old male presenting to the ED accompanied by mother for evaluation with chief complaint of persistent abdominal pain  The patient works for [employer], stating that a couple of weeks ago at work he was "throwing" 50 lb boxes around and felt the onset of a sharp pain in the left lower quadrant in the abdomen  He reports that he has continued with sporadic episodes of sharp abdominal pain that involves the llq and rlq, and groin  He states that when he had initially injured himself he felt nauseous and had one episode of vomiting  On my assessment of the patient, when asked to locate his pain he points to the suprapubic region  He states that he sometimes has some burning with urination  He denies penile drainage or discharge, testicular pain, or scrotal edema  He denies fevers, chills, sweats  He has no pertinent surgical history  Denies recent travel or recent antibiotic use  He has not tried anything for alleviation of symptoms thus far  He expresses concern for hernias  He has not appreciated any area of swelling or bulging to the abdomen or groin  He offers no other complaints or concerns at this time  Prior to Admission Medications   Prescriptions Last Dose Informant Patient Reported? Taking?   busPIRone (BUSPAR) 10 mg tablet  Self No No   Sig: Take 1 tablet (10 mg total) by mouth 3 (three) times a day   citalopram (CeleXA) 20 mg tablet  Self No No   Sig: Take 1 tablet (20 mg total) by mouth daily      Facility-Administered Medications: None       Past Medical History:   Diagnosis Date   • Low blood pressure    • Urinary urgency    • Urine incontinence        History reviewed  No pertinent surgical history      Family History   Problem Relation Age of Onset   • No Known Problems Mother    • No Known Problems Father      I have reviewed and agree with the history as documented  E-Cigarette/Vaping   • E-Cigarette Use Never User      E-Cigarette/Vaping Substances   • Nicotine No    • THC No    • CBD No    • Flavoring No    • Other No    • Unknown No      Social History     Tobacco Use   • Smoking status: Current Every Day Smoker     Packs/day: 1 00     Types: Cigarettes   • Smokeless tobacco: Former User     Types: Chew     Quit date: 2016   Vaping Use   • Vaping Use: Never used   Substance Use Topics   • Alcohol use: Yes     Comment: occasion   • Drug use: Yes     Types: Marijuana, Methamphetamines       Review of Systems   Constitutional: Negative for chills, diaphoresis and fever  Gastrointestinal: Positive for abdominal pain, nausea (resolved) and vomiting  Genitourinary: Positive for dysuria  Negative for difficulty urinating, flank pain, frequency, hematuria, penile discharge, scrotal swelling, testicular pain and urgency  Musculoskeletal: Negative for arthralgias and myalgias  Psychiatric/Behavioral: The patient is nervous/anxious  All other systems reviewed and are negative  Physical Exam  Physical Exam  Vitals and nursing note reviewed  Constitutional:       General: He is not in acute distress  Appearance: Normal appearance  He is well-developed  He is not ill-appearing, toxic-appearing or diaphoretic  HENT:      Head: Normocephalic and atraumatic  Right Ear: External ear normal       Left Ear: External ear normal    Eyes:      Conjunctiva/sclera: Conjunctivae normal    Cardiovascular:      Rate and Rhythm: Normal rate and regular rhythm  Pulses: Normal pulses  Pulmonary:      Effort: Pulmonary effort is normal  No respiratory distress  Breath sounds: Normal breath sounds  No wheezing, rhonchi or rales  Chest:      Chest wall: No tenderness  Abdominal:      General: There is no distension  Palpations: Abdomen is soft  Tenderness: There is abdominal tenderness (lower)  There is no right CVA tenderness, left CVA tenderness, guarding or rebound  Hernia: No hernia is present  Comments: Abdomen soft, nondistended, with generalized lower abdominal wall tenderness  No palpable hernia  No peritoneal signs  Musculoskeletal:         General: Normal range of motion  Cervical back: Normal range of motion and neck supple  Skin:     General: Skin is warm and dry  Capillary Refill: Capillary refill takes less than 2 seconds  Neurological:      Mental Status: He is alert  Motor: Motor function is intact  No weakness  Gait: Gait is intact     Psychiatric:         Mood and Affect: Mood normal          Vital Signs  ED Triage Vitals   Temperature Pulse Respirations Blood Pressure SpO2   10/24/22 1414 10/24/22 1414 10/24/22 1414 10/24/22 1414 10/24/22 1414   (!) 97 4 °F (36 3 °C) (!) 107 16 145/87 98 %      Temp Source Heart Rate Source Patient Position - Orthostatic VS BP Location FiO2 (%)   10/24/22 1414 10/24/22 1414 10/24/22 1414 10/24/22 1414 --   Tympanic Monitor Sitting Left arm       Pain Score       10/24/22 1533       7           Vitals:    10/24/22 1414   BP: 145/87   Pulse: (!) 107   Patient Position - Orthostatic VS: Sitting         Visual Acuity      ED Medications  Medications   ketorolac (TORADOL) injection 15 mg (15 mg Intravenous Given 10/24/22 1533)   sodium chloride 0 9 % bolus 1,000 mL (0 mL Intravenous Stopped 10/24/22 1713)   iohexol (OMNIPAQUE) 350 MG/ML injection (SINGLE-DOSE) 100 mL (100 mL Intravenous Given 10/24/22 1604)       Diagnostic Studies  Results Reviewed     Procedure Component Value Units Date/Time    UA w Reflex to Microscopic w Reflex to Culture [[id number]]  (Abnormal) Collected: 10/24/22 1629    Lab Status: Final result Specimen: Urine, Clean Catch Updated: 10/24/22 1646     Color, UA Colorless     Clarity, UA Clear     Specific Gravity, UA 1 045     pH, UA 7 0 Leukocytes, UA Negative     Nitrite, UA Negative     Protein, UA Negative mg/dl      Glucose, UA Negative mg/dl      Ketones, UA Negative mg/dl      Urobilinogen, UA <2 0 mg/dl      Bilirubin, UA Negative     Occult Blood, UA Negative    Hepatic function panel [[id number]]  (Normal) Collected: 10/24/22 1533    Lab Status: Final result Specimen: Blood from Arm, Left Updated: 10/24/22 1556     Total Bilirubin 0 24 mg/dL      Bilirubin, Direct 0 08 mg/dL      Alkaline Phosphatase 56 U/L      AST 31 U/L      ALT 39 U/L      Total Protein 6 4 g/dL      Albumin 3 6 g/dL     Basic metabolic panel [[id number]] Collected: 10/24/22 1533    Lab Status: Final result Specimen: Blood from Arm, Left Updated: 10/24/22 1555     Sodium 143 mmol/L      Potassium 4 5 mmol/L      Chloride 107 mmol/L      CO2 30 mmol/L      ANION GAP 6 mmol/L      BUN 15 mg/dL      Creatinine 0 83 mg/dL      Glucose 85 mg/dL      Calcium 9 0 mg/dL      eGFR 123 ml/min/1 73sq m     Narrative:      [facility] guidelines for Chronic Kidney Disease (CKD):   •  Stage 1 with normal or high GFR (GFR > 90 mL/min/1 73 square meters)  •  Stage 2 Mild CKD (GFR = 60-89 mL/min/1 73 square meters)  •  Stage 3A Moderate CKD (GFR = 45-59 mL/min/1 73 square meters)  •  Stage 3B Moderate CKD (GFR = 30-44 mL/min/1 73 square meters)  •  Stage 4 Severe CKD (GFR = 15-29 mL/min/1 73 square meters)  •  Stage 5 End Stage CKD (GFR <15 mL/min/1 73 square meters)  Note: GFR calculation is accurate only with a steady state creatinine    CBC and differential [[id number]]  (Abnormal) Collected: 10/24/22 1533    Lab Status: Final result Specimen: Blood from Arm, Left Updated: 10/24/22 1539     WBC 12 14 Thousand/uL      RBC 4 60 Million/uL      Hemoglobin 14 6 g/dL      Hematocrit 43 3 %      MCV 94 fL      MCH 31 7 pg      MCHC 33 7 g/dL      RDW 12 8 %      MPV 9 1 fL      Platelets 467 Thousands/uL      nRBC 0 /100 WBCs      Neutrophils Relative 76 %      Immat GRANS % 0 %      Lymphocytes Relative 12 %      Monocytes Relative 7 %      Eosinophils Relative 4 %      Basophils Relative 1 %      Neutrophils Absolute 9 25 Thousands/µL      Immature Grans Absolute 0 03 Thousand/uL      Lymphocytes Absolute 1 49 Thousands/µL      Monocytes Absolute 0 87 Thousand/µL      Eosinophils Absolute 0 42 Thousand/µL      Basophils Absolute 0 08 Thousands/µL                  CT abdomen pelvis with contrast   Final Result by Ayanna Lofton MD (10/24 1631)      No evidence of diverticulitis or colitis   Fluid-filled small bowel loops in the lower abdomen, nonspecific may be due to enteritis   Normal size appendix with appendicolith there is proximal to midportion without any significant surrounding inflammatory changes or distention, correlate clinically   No inguinal hernia seen          I personally discussed this study with Dr Kyle Arvizu on 10/24/2022 at 4:31 PM                      Workstation performed: [id number]                    Procedures  Procedures         ED Course                               SBIRT 22yo+    Flowsheet Row Most Recent Value   SBIRT (25 yo +)    In order to provide better care to our patients, we are screening all of our patients for alcohol and drug use  Would it be okay to ask you these screening questions? Yes Filed at: 10/24/2022 1517   Initial Alcohol Screen: US AUDIT-C     1  How often do you have a drink containing alcohol? 0 Filed at: 10/24/2022 1517   2  How many drinks containing alcohol do you have on a typical day you are drinking? 0 Filed at: 10/24/2022 1517   3a  Male UNDER 65: How often do you have five or more drinks on one occasion? 0 Filed at: 10/24/2022 1517   Audit-C Score 0 Filed at: 10/24/2022 1517   CHIARA: How many times in the past year have you    Used an illegal drug or used a prescription medication for non-medical reasons?  Never Filed at: 10/24/2022 1517                    MDM  Number of Diagnoses or Management Options  Abdominal pain: new and requires workup  Diagnosis management comments: This is a 57-year-old male presenting to the emergency department for evaluation of several weeks of lower abdominal pain  Patient reports that while at work he was throwing around 50 lb boxes at which time he felt the onset of left lower quadrant abdominal pain though over the past several weeks he has had episodes of right-sided and left-sided lower abdominal pain  Patient reports that his symptoms often occur at rest, but are worsened by position change on occasion  He has not felt any appreciable area of swelling or mass  He is very concerned about having hernias  Differential diagnosis includes but is not limited to: abdominal wall strain/sprain, hernia, enteritis, colitis, ibs, cystitis, uti    Initial ED plan: labs, imaging, UA    Final ED Assessment: Vital signs reviewed on ED presentation, examination as above  All labs and imaging independently reviewed with imaging interpreted by the Radiologist   There were no significant lab findings, and urinalysis is bland without evidence of urinary tract infection  CT abdomen/pelvis reports fluid-filled small bowel loops in the lower abdomen which is nonspecific and might be due to enteritis and a normal size appendix with appendicolith  Test results reviewed with the patient and mother at bedside  Reviewed supportive symptom management to include rest and avoiding heavy lifting, heating pad, Tylenol and ibuprofen as needed for pain control  Will also discharge with prescription for Bentyl to take as needed for alleviation of abdominal cramping  Recommended outpatient Gastroenterology follow-up if symptoms continue and parameters for ED return discussed at length  Patient comfortable and agreeable with discharge plan as above and was discharged in stable condition, ambulating independently from the emergency department today without issue           Amount and/or Complexity of Data Reviewed  Clinical lab tests: ordered and reviewed  Tests in the radiology section of CPT®: ordered and reviewed  Review and summarize past medical records: yes  Independent visualization of images, tracings, or specimens: yes    Risk of Complications, Morbidity, and/or Mortality  Presenting problems: moderate  Diagnostic procedures: moderate  Management options: low    Patient Progress  Patient progress: stable      Disposition  Final diagnoses:   Abdominal pain     Time reflects when diagnosis was documented in both MDM as applicable and the Disposition within this note     Time User Action Codes Description Comment    10/24/2022  4:58 PM Maria Eugenia Toney Add [R10 9] Abdominal pain       ED Disposition     ED Disposition   Discharge    Condition   Stable    Date/Time   Mon Oct 24, 2022  4:57 PM    Comment   Yissel Choe discharge to home/self care                 Follow-up Information     Follow up With Specialties Details Why Contact Info Additional [address], Nurse Practitioner   [address] [facility] [address] [place] [address]       [facility]   [address] [id number]  [address] [facility], [address] [facility], [facility], [place], [place], [address] [facility] Emergency Department Emergency Medicine  If symptoms worsen [address] [facility] [id number] [facility] Emergency Department, [address] [place], [place], [address]    [address] [place] [address]  [phone]           Discharge Medication List as of 10/24/2022  4:59 PM      START taking these medications    Details   dicyclomine (BENTYL) 20 mg tablet Take 1 tablet (20 mg total) by mouth 2 (two) times a day, Starting Mon 10/24/2022, Normal         CONTINUE these medications which have NOT CHANGED    Details   busPIRone (BUSPAR) 10 mg tablet Take 1 tablet (10 mg total) by mouth 3 (three) times a day, Starting Thu 10/15/2020, Normal      citalopram (CeleXA) 20 mg tablet Take 1 tablet (20 mg total) by mouth daily, Starting Thu 10/15/2020, Normal             No discharge procedures on file      PDMP Review     None          ED Provider  Electronically Signed by           Chanel Ochoa PA-C  10/27/22 8033

## 2022-10-24 NOTE — Clinical Note
Carlos A Martel was seen and treated in our emergency department on 10/24/2022  Diagnosis: Seen in ED    Kirill Manuel  may return to work on return date  He may return on this date: 10/27/2022         If you have any questions or concerns, please don't hesitate to call        Bita Concepcion PA-C    ______________________________           _______________          _______________  Hospital Representative                              Date                                Time

## 2022-11-28 ENCOUNTER — OFFICE VISIT (OUTPATIENT)
Dept: FAMILY MEDICINE CLINIC | Facility: CLINIC | Age: 22
End: 2022-11-28

## 2022-11-28 VITALS — HEART RATE: 94 BPM | OXYGEN SATURATION: 98 % | TEMPERATURE: 98.1 F

## 2022-11-28 DIAGNOSIS — J02.9 SORE THROAT: Primary | ICD-10-CM

## 2022-11-28 DIAGNOSIS — J01.01 ACUTE RECURRENT MAXILLARY SINUSITIS: ICD-10-CM

## 2022-11-28 LAB
SARS-COV-2 AG UPPER RESP QL IA: NEGATIVE
VALID CONTROL: NORMAL

## 2022-11-28 RX ORDER — DEXTROMETHORPHAN HYDROBROMIDE AND PROMETHAZINE HYDROCHLORIDE 15; 6.25 MG/5ML; MG/5ML
10 SOLUTION ORAL 3 TIMES DAILY PRN
Qty: 240 ML | Refills: 1 | Status: SHIPPED | OUTPATIENT
Start: 2022-11-28

## 2022-11-28 RX ORDER — AMOXICILLIN AND CLAVULANATE POTASSIUM 875; 125 MG/1; MG/1
1 TABLET, FILM COATED ORAL EVERY 12 HOURS SCHEDULED
Qty: 20 TABLET | Refills: 0 | Status: SHIPPED | OUTPATIENT
Start: 2022-11-28 | End: 2022-12-08

## 2022-11-28 NOTE — ASSESSMENT & PLAN NOTE
Start antibiotic at this time include Mucinex D maximum Strength as patient has sinus pressure pressure into his ears bad sore throat and he developed a cough  Sent home from work today for evaluation    COVID negative

## 2022-11-28 NOTE — PROGRESS NOTES
Assessment/Plan:       Problem List Items Addressed This Visit        Respiratory    Acute recurrent maxillary sinusitis     Start antibiotic at this time include Mucinex D maximum Strength as patient has sinus pressure pressure into his ears bad sore throat and he developed a cough  Sent home from work today for evaluation  COVID negative         Relevant Medications    amoxicillin-clavulanate (AUGMENTIN) 875-125 mg per tablet    Promethazine-DM (PHENERGAN-DM) 6 25-15 mg/5 mL oral syrup   Other Visit Diagnoses     Sore throat    -  Primary    Relevant Medications    amoxicillin-clavulanate (AUGMENTIN) 875-125 mg per tablet    Promethazine-DM (PHENERGAN-DM) 6 25-15 mg/5 mL oral syrup    Other Relevant Orders    POCT Rapid Covid Ag (Completed)            Subjective:      Patient ID: Kimberly Stuart is a 23 y o  male  Cough congestion sore throat  The following portions of the patient's history were reviewed and updated as appropriate: allergies, current medications, past family history, past medical history, past social history, past surgical history and problem list     Review of Systems   Constitutional: Negative for chills, fatigue and fever  HENT: Positive for congestion, sinus pressure and sore throat  Negative for nosebleeds and rhinorrhea  Eyes: Negative for discharge and redness  Respiratory: Negative for cough and shortness of breath  Cardiovascular: Negative for chest pain, palpitations and leg swelling  Gastrointestinal: Negative for abdominal pain, blood in stool and nausea  Endocrine: Negative for cold intolerance, heat intolerance and polyuria  Genitourinary: Negative for dysuria and frequency  Musculoskeletal: Negative for arthralgias, back pain and myalgias  Skin: Negative for rash  Neurological: Negative for dizziness, weakness and headaches  Hematological: Negative for adenopathy  Psychiatric/Behavioral: Negative for behavioral problems and sleep disturbance   The patient is not nervous/anxious  Objective:      Pulse 94   Temp 98 1 °F (36 7 °C)   SpO2 98%        Physical Exam  Vitals and nursing note reviewed  Constitutional:       Appearance: He is well-developed and well-nourished  HENT:      Head: Normocephalic and atraumatic  Right Ear: Tympanic membrane and external ear normal       Left Ear: Tympanic membrane and external ear normal       Nose: Congestion and rhinorrhea present  Mouth/Throat:      Pharynx: Posterior oropharyngeal erythema present  Tonsils: 1+ on the right  1+ on the left  Eyes:      General: No scleral icterus  Extraocular Movements: EOM normal       Conjunctiva/sclera: Conjunctivae normal       Pupils: Pupils are equal, round, and reactive to light  Neck:      Thyroid: No thyromegaly  Vascular: No JVD  Cardiovascular:      Rate and Rhythm: Normal rate and regular rhythm  Heart sounds: Normal heart sounds  No murmur heard  Pulmonary:      Effort: Pulmonary effort is normal       Breath sounds: Normal breath sounds  No wheezing or rales  Chest:      Chest wall: No tenderness  Abdominal:      General: Bowel sounds are normal  There is no distension  Palpations: Abdomen is soft  There is no mass  Tenderness: There is no abdominal tenderness  There is no guarding or rebound  Musculoskeletal:         General: No tenderness, deformity or edema  Normal range of motion  Cervical back: Normal range of motion and neck supple  Lymphadenopathy:      Cervical: No cervical adenopathy  Skin:     General: Skin is warm and dry  Findings: No erythema or rash  Neurological:      Mental Status: He is alert and oriented to person, place, and time  Cranial Nerves: No cranial nerve deficit  Deep Tendon Reflexes: Reflexes are normal and symmetric   Reflexes normal    Psychiatric:         Mood and Affect: Mood and affect normal          Behavior: Behavior normal          Thought Content: Thought content normal          Judgment: Judgment normal           Data:    Laboratory Results:   Radiology/Other Diagnostic Testing Results:      Lab Results   Component Value Date    WBC 12 14 (H) 10/24/2022    HGB 14 6 10/24/2022    HCT 43 3 10/24/2022    MCV 94 10/24/2022     10/24/2022     Lab Results   Component Value Date    K 4 5 10/24/2022     10/24/2022    CO2 30 10/24/2022    BUN 15 10/24/2022    CREATININE 0 83 10/24/2022    GLUF 86 09/23/2020    CALCIUM 9 0 10/24/2022    AST 31 10/24/2022    ALT 39 10/24/2022    ALKPHOS 56 10/24/2022    EGFR 123 10/24/2022     No results found for: CHOLESTEROL  No results found for: HDL  No results found for: LDLCALC  No results found for: TRIG  No results found for: [place], [place]  Lab Results   Component Value Date    [id number] 0 998 09/23/2020     No results found for: HGBA1C  No results found for: PSA    Tonna Ganser, DO

## 2022-12-06 DIAGNOSIS — F41.9 ANXIETY: ICD-10-CM

## 2022-12-06 DIAGNOSIS — F32.A MILD DEPRESSION: ICD-10-CM

## 2022-12-07 RX ORDER — CITALOPRAM 20 MG/1
20 TABLET ORAL DAILY
Qty: 30 TABLET | Refills: 0 | Status: SHIPPED | OUTPATIENT
Start: 2022-12-07

## 2022-12-07 RX ORDER — BUSPIRONE HYDROCHLORIDE 10 MG/1
10 TABLET ORAL 3 TIMES DAILY
Qty: 90 TABLET | Refills: 0 | Status: SHIPPED | OUTPATIENT
Start: 2022-12-07

## 2023-01-27 PROBLEM — J01.01 ACUTE RECURRENT MAXILLARY SINUSITIS: Status: RESOLVED | Noted: 2022-11-28 | Resolved: 2023-01-27

## 2024-05-14 ENCOUNTER — TELEPHONE (OUTPATIENT)
Dept: FAMILY MEDICINE CLINIC | Facility: CLINIC | Age: 24
End: 2024-05-14

## 2024-05-14 NOTE — TELEPHONE ENCOUNTER
Pt's phone number is not in service. Left voice mail on pt's mom phone number. Pt is over due for his annual check up. Last seen was in 11/28/2022.